# Patient Record
Sex: MALE | URBAN - METROPOLITAN AREA
[De-identification: names, ages, dates, MRNs, and addresses within clinical notes are randomized per-mention and may not be internally consistent; named-entity substitution may affect disease eponyms.]

---

## 2018-09-21 NOTE — TELEPHONE ENCOUNTER
FUTURE VISIT INFORMATION      FUTURE VISIT INFORMATION:    Date: 09/24/2018    Time: 8:00 am      [place] [facility] [place]FORMATION:    Referring provider:  Dr. Jonathan Homans    Referring providers clinic:  Possible neuropathy. muscle twitching, and pin prick sensation, per Dre at [facility]. Referred by Dr. Jonathan Homans    NOTES (FOR ALL VISITS) STATUS DETAILS   OFFICE NOTE from referring provider N/A    OFFICE NOTE from other specialist N/A    DISCHARGE SUMMARY from hospital N/A    DISCHARGE REPORT from the ER N/A    OPERATIVE REPORT N/A    MEDICATION LIST N/A    IMAGING  (FOR ALL VISITS)     EMG N/A    EEG N/A    ECT N/A    MRI (HEAD, NECK, SPINE) N/A    CT (HEAD, NECK, SPINE) N/A    OTHER

## 2018-09-24 ENCOUNTER — PRE VISIT (OUTPATIENT)
Dept: NEUROLOGY | Facility: CLINIC | Age: 48
End: 2018-09-24

## 2018-09-24 ENCOUNTER — OFFICE VISIT (OUTPATIENT)
Dept: NEUROLOGY | Facility: CLINIC | Age: 48
End: 2018-09-24
Payer: COMMERCIAL

## 2018-09-24 VITALS
OXYGEN SATURATION: 95 % | BODY MASS INDEX: 29.5 KG/M2 | WEIGHT: 217.8 LBS | HEART RATE: 93 BPM | RESPIRATION RATE: 16 BRPM | HEIGHT: 72 IN | DIASTOLIC BLOOD PRESSURE: 77 MMHG | TEMPERATURE: 97.6 F | SYSTOLIC BLOOD PRESSURE: 109 MMHG

## 2018-09-24 DIAGNOSIS — R20.2 PARESTHESIA: Primary | ICD-10-CM

## 2018-09-24 RX ORDER — INSULIN ASPART 100 [IU]/ML
INJECTION, SOLUTION INTRAVENOUS; SUBCUTANEOUS PRN
Refills: 3 | COMMUNITY
Start: 2018-09-17

## 2018-09-24 RX ORDER — GLIPIZIDE 5 MG/1
TABLET ORAL DAILY
Refills: 1 | COMMUNITY
Start: 2018-09-17

## 2018-09-24 RX ORDER — QUETIAPINE 300 MG/1
600 TABLET, FILM COATED, EXTENDED RELEASE ORAL DAILY
COMMUNITY
Start: 2018-09-21

## 2018-09-24 ASSESSMENT — PAIN SCALES - GENERAL: PAINLEVEL: NO PAIN (0)

## 2018-09-24 NOTE — NURSING NOTE
Chief Complaint   Patient presents with     Consult     [facility] NEW POSSIBLE NEUROPATHY, MUSCLE TWITCHING, AND PIN PRICK SENSATION      Jez Saunders, EMT
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
## 2018-09-24 NOTE — MR AVS SNAPSHOT
"              After Visit Summary   2018    Samuel Ravi    MRN: [id number]           Patient Information     Date Of Birth          1970        Visit Information        Provider Department      2018 8:00 AM Roseanne Major MD [facility] Neurology        Today's Diagnoses     Paresthesia    -  1      Care Instructions    Dr Pedrito will review your outside labs and head scans. Then she will be in ouch if other testing is needed.    The neurological examination is normal today no sign of a neuropathy          Follow-ups after your visit        Who to contact     Please call your clinic at [phone] to:    Ask questions about your health    Make or cancel appointments    Discuss your medicines    Learn about your test results    Speak to your doctor            Additional Information About Your Visit        MyChart Information     Truffls is an electronic gateway that provides easy, online access to your medical records. With [employer]ffls, you can request a clinic appointment, read your test results, renew a prescription or communicate with your care team.     To sign up for R[employer] visit the website at [email or web address]   You will be asked to enter the access code listed below, as well as some personal information. Please follow the directions to create your username and password.     Your access code is: [email or web address]  Expires: 2018  6:30 AM     Your access code will  in 90 days. If you need help or a new code, please contact your [facility] or call [phone] for assistance.        Care EveryWhere ID     This is your Care EveryWhere ID. This could be used by other organizations to access your [facility] medical records  [id number]        Your Vitals Were     Pulse Temperature Respirations Height Pulse Oximetry BMI (Body Mass Index)    93 97.6  F (36.4  C) (Oral) 16 1.834 m (6' 0.2\") 95% 29.38 kg/m2       Blood Pressure from Last 3 " Encounters:   09/24/18 109/77    Weight from Last 3 Encounters:   09/24/18 98.8 kg (217 lb 12.8 oz)              Today, you had the following     No orders found for display       Primary Care Provider    None Specified       No primary provider on file.        Equal Access to Services     TG OBANDO : Hadii aad ku hadalexavamsi Jane, wabeda luqadaha, fidelta kaalmada janinegabi, kelsey ivory juan pablolaurie garciamarianadan yee . So [facility] [place] [phone].    ATENCIÓN: Si habla español, tiene a michelle disposición servicios gratuitos de asistencia lingüística. Llame al [phone].    We comply with applicable federal civil rights laws and [place] laws. We do not discriminate on the basis of race, color, national origin, age, disability, sex, sexual orientation, or gender identity.            Thank you!     Thank you for choosing [facility] NEUROLOGY  for your care. Our goal is always to provide you with excellent care. Hearing back from our patients is one way we can continue to improve our services. Please take a few minutes to complete the written survey that you may receive in the mail after your visit with us. Thank you!             Your Updated Medication List - Protect others around you: Learn how to safely use, store and throw away your medicines at www.disposemymeds.org.          This list is accurate as of 9/24/18  9:05 AM.  Always use your most recent med list.                   Brand Name Dispense Instructions for use Diagnosis    glipiZIDE 5 MG tablet    GLUCOTROL     daily        metFORMIN 1000 MG tablet    GLUCOPHAGE     daily (with breakfast)        NovoLOG FLEXPEN 100 UNIT/ML injection   Generic drug:  insulin aspart      as needed        ONETOUCH ULTRA test strip   Generic drug:  blood glucose monitoring      daily        QUEtiapine 300 MG 24 hr tablet    SEROquel XR     600 mg daily

## 2018-09-24 NOTE — PATIENT INSTRUCTIONS
Dr Major will review your outside labs and head scans. Then she will be in ou if other testing is needed.    The neurological examination is normal today no sign of a neuropathy